# Patient Record
Sex: MALE | Race: WHITE | NOT HISPANIC OR LATINO | Employment: OTHER | URBAN - METROPOLITAN AREA
[De-identification: names, ages, dates, MRNs, and addresses within clinical notes are randomized per-mention and may not be internally consistent; named-entity substitution may affect disease eponyms.]

---

## 2020-01-22 NOTE — PATIENT DISCUSSION
The patient was informed that with [facility] for distance, they will need glasses for all near and intermediate activities after surgery. The patient understands there is a possibility they may need an enhancement after surgery.  The patient elects Custom IOL OS, goal emmetropia.

## 2020-01-29 NOTE — PATIENT DISCUSSION
The patient was informed that with [address] [facility] for distance, they will need glasses for all near and intermediate activities after surgery. The patient understands there is a possibility they may need an enhancement after surgery.  The patient elects Custom IOL OS, goal emmetropia.

## 2020-02-03 NOTE — PATIENT DISCUSSION
The patient was informed that with [address] [facility] for distance, they will need glasses for all near and intermediate activities after surgery. The patient understands there is a possibility they may need an enhancement after surgery.  The patient elects Custom IOL OS, goal emmetropia.

## 2020-02-04 NOTE — PATIENT DISCUSSION
Cataract surgery has been performed in the first eye and activities of daily living are still impaired. The patient would like to proceed with cataract surgery in the second eye as scheduled. The patient elects CV IOL with a goal of lio vs -2.00 OD, goal of emmetropia.

## 2020-02-10 NOTE — PATIENT DISCUSSION
Cataract surgery has been performed in the first eye and activities of daily living are still impaired. The patient would like to proceed with cataract surgery in the second eye as scheduled. The patient elects CV IOL OD with a goal -2.00/-2.25 for monovision.

## 2020-02-10 NOTE — PATIENT DISCUSSION
Patient to decide on goal OD, day of surgery (lio vs -2.00/-2.25); ***2/10/20 The patient has decided to keep her right eye nearsighted for a monovision outcome, pt elects Custom IOL OD, goal -2.00/-2.25, Lina***.

## 2022-03-28 ENCOUNTER — NEW PATIENT (OUTPATIENT)
Dept: URBAN - METROPOLITAN AREA CLINIC 47 | Facility: CLINIC | Age: 57
End: 2022-03-28

## 2022-03-28 DIAGNOSIS — H52.13: ICD-10-CM

## 2022-03-28 DIAGNOSIS — H52.203: ICD-10-CM

## 2022-03-28 DIAGNOSIS — H52.4: ICD-10-CM

## 2022-03-28 DIAGNOSIS — I10: ICD-10-CM

## 2022-03-28 PROCEDURE — 92015 DETERMINE REFRACTIVE STATE: CPT

## 2022-03-28 PROCEDURE — 99199RSP RESIDENT SUPERVISED BY PROVIDER

## 2022-03-28 PROCEDURE — 92004 COMPRE OPH EXAM NEW PT 1/>: CPT

## 2022-03-28 ASSESSMENT — VISUAL ACUITY
OS_SC: J1
OS_SC: 20/300
OS_CC: 20/20
OD_CC: J1
OD_SC: J1
OD_CC: 20/20
OS_CC: J1
OD_SC: 20/100

## 2022-03-28 ASSESSMENT — TONOMETRY
OD_IOP_MMHG: 14
OS_IOP_MMHG: 14

## 2022-12-22 NOTE — PATIENT DISCUSSION
Instructed to call immediately if any new distortion, blurring, decreased vision or eye pain. Topical Sulfur Applications Counseling: Topical Sulfur Counseling: Patient counseled that this medication may cause skin irritation or allergic reactions.  In the event of skin irritation, the patient was advised to reduce the amount of the drug applied or use it less frequently.   The patient verbalized understanding of the proper use and possible adverse effects of topical sulfur application.  All of the patient's questions and concerns were addressed.

## 2023-07-05 NOTE — PATIENT DISCUSSION
[employer] OU. Posterior Auricular Interpolation Flap Text: A decision was made to reconstruct the defect utilizing an interpolation axial flap and a staged reconstruction.  A telfa template was made of the defect.  This telfa template was then used to outline the posterior auricular interpolation flap.  The donor area for the pedicle flap was then injected with anesthesia.  The flap was excised through the skin and subcutaneous tissue down to the layer of the underlying musculature.  The pedicle flap was carefully excised within this deep plane to maintain its blood supply.  The edges of the donor site were undermined.   The donor site was closed in a primary fashion.  The pedicle was then rotated into position and sutured.  Once the tube was sutured into place, adequate blood supply was confirmed with blanching and refill.  The pedicle was then wrapped with xeroform gauze and dressed appropriately with a telfa and gauze bandage to ensure continued blood supply and protect the attached pedicle.

## 2024-08-13 ENCOUNTER — COMPREHENSIVE EXAM (OUTPATIENT)
Dept: URBAN - METROPOLITAN AREA CLINIC 47 | Facility: CLINIC | Age: 59
End: 2024-08-13

## 2024-08-13 DIAGNOSIS — H52.223: ICD-10-CM

## 2024-08-13 DIAGNOSIS — H52.4: ICD-10-CM

## 2024-08-13 DIAGNOSIS — H52.13: ICD-10-CM

## 2024-08-13 PROCEDURE — 92015 DETERMINE REFRACTIVE STATE: CPT

## 2024-08-13 PROCEDURE — 92014 COMPRE OPH EXAM EST PT 1/>: CPT

## 2024-08-13 ASSESSMENT — TONOMETRY
OD_IOP_MMHG: 11
OS_IOP_MMHG: 12

## 2024-08-13 ASSESSMENT — VISUAL ACUITY
OS_CC: J1
OD_CC: J1
OD_CC: 20/20
OU_CC: J1
OU_CC: 20/20
OS_CC: 20/20